# Patient Record
Sex: FEMALE | Race: WHITE
[De-identification: names, ages, dates, MRNs, and addresses within clinical notes are randomized per-mention and may not be internally consistent; named-entity substitution may affect disease eponyms.]

---

## 2019-06-10 ENCOUNTER — HOSPITAL ENCOUNTER (EMERGENCY)
Dept: HOSPITAL 91 - FTE | Age: 39
Discharge: HOME | End: 2019-06-10
Payer: SELF-PAY

## 2019-06-10 ENCOUNTER — HOSPITAL ENCOUNTER (EMERGENCY)
Dept: HOSPITAL 10 - FTE | Age: 39
Discharge: HOME | End: 2019-06-10
Payer: SELF-PAY

## 2019-06-10 VITALS
BODY MASS INDEX: 25.76 KG/M2 | BODY MASS INDEX: 25.76 KG/M2 | WEIGHT: 136.47 LBS | HEIGHT: 61 IN | HEIGHT: 61 IN | WEIGHT: 136.47 LBS

## 2019-06-10 VITALS — HEART RATE: 62 BPM | SYSTOLIC BLOOD PRESSURE: 110 MMHG | DIASTOLIC BLOOD PRESSURE: 67 MMHG

## 2019-06-10 VITALS — RESPIRATION RATE: 18 BRPM

## 2019-06-10 DIAGNOSIS — R10.2: ICD-10-CM
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
## 2019-06-10 DIAGNOSIS — T83.9XXA: Primary | ICD-10-CM

## 2019-06-10 LAB
ADD MAN DIFF?: NO
ADD UMIC: YES
ALANINE AMINOTRANSFERASE: 26 IU/L (ref 13–69)
ALBUMIN/GLOBULIN RATIO: 1.19
ALBUMIN: 4.3 G/DL (ref 3.3–4.9)
ALKALINE PHOSPHATASE: 92 IU/L (ref 42–121)
ANION GAP: 9 (ref 5–13)
ASPARTATE AMINO TRANSFERASE: 23 IU/L (ref 15–46)
BASOPHIL #: 0.1 10^3/UL (ref 0–0.1)
BASOPHILS %: 0.6 % (ref 0–2)
BILIRUBIN,DIRECT: 0 MG/DL (ref 0–0.2)
BILIRUBIN,TOTAL: 0.4 MG/DL (ref 0.2–1.3)
BLOOD UREA NITROGEN: 11 MG/DL (ref 7–20)
CALCIUM: 9.1 MG/DL (ref 8.4–10.2)
CARBON DIOXIDE: 26 MMOL/L (ref 21–31)
CHLORIDE: 105 MMOL/L (ref 97–110)
CREATININE: 0.57 MG/DL (ref 0.44–1)
EOSINOPHILS #: 0.2 10^3/UL (ref 0–0.5)
EOSINOPHILS %: 1.9 % (ref 0–7)
GLOBULIN: 3.6 G/DL (ref 1.3–3.2)
GLUCOSE: 108 MG/DL (ref 70–220)
HEMATOCRIT: 39.6 % (ref 37–47)
HEMOGLOBIN: 13.4 G/DL (ref 12–16)
IMMATURE GRANS #M: 0.04 10^3/UL (ref 0–0.03)
IMMATURE GRANS % (M): 0.4 % (ref 0–0.43)
LYMPHOCYTES #: 1.4 10^3/UL (ref 0.8–2.9)
LYMPHOCYTES %: 12.5 % (ref 15–51)
MEAN CORPUSCULAR HEMOGLOBIN: 30.3 PG (ref 29–33)
MEAN CORPUSCULAR HGB CONC: 33.8 G/DL (ref 32–37)
MEAN CORPUSCULAR VOLUME: 89.6 FL (ref 82–101)
MEAN PLATELET VOLUME: 9.2 FL (ref 7.4–10.4)
MONOCYTE #: 0.4 10^3/UL (ref 0.3–0.9)
MONOCYTES %: 3.3 % (ref 0–11)
NEUTROPHIL #: 8.9 10^3/UL (ref 1.6–7.5)
NEUTROPHILS %: 81.3 % (ref 39–77)
NUCLEATED RED BLOOD CELLS #: 0 10^3/UL (ref 0–0)
NUCLEATED RED BLOOD CELLS%: 0 /100WBC (ref 0–0)
PLATELET COUNT: 304 10^3/UL (ref 140–415)
POTASSIUM: 4 MMOL/L (ref 3.5–5.1)
RED BLOOD COUNT: 4.42 10^6/UL (ref 4.2–5.4)
RED CELL DISTRIBUTION WIDTH: 12.1 % (ref 11.5–14.5)
SODIUM: 140 MMOL/L (ref 135–144)
TOTAL PROTEIN: 7.9 G/DL (ref 6.1–8.1)
UR ASCORBIC ACID: NEGATIVE MG/DL
UR BACTERIA: (no result) /HPF
UR BILIRUBIN (DIP): NEGATIVE MG/DL
UR BLOOD (DIP): (no result) MG/DL
UR CLARITY: CLEAR
UR COLOR: (no result)
UR GLUCOSE (DIP): NEGATIVE MG/DL
UR KETONES (DIP): NEGATIVE MG/DL
UR LEUKOCYTE ESTERASE (DIP): NEGATIVE LEU/UL
UR NITRITE (DIP): NEGATIVE MG/DL
UR PH (DIP): 7 (ref 5–9)
UR RBC: 114 /HPF (ref 0–5)
UR SPECIFIC GRAVITY (DIP): 1.01 (ref 1–1.03)
UR SQUAMOUS EPITHELIAL CELL: (no result) /HPF
UR TOTAL PROTEIN (DIP): NEGATIVE MG/DL
UR UROBILINOGEN (DIP): NEGATIVE MG/DL
UR WBC: 3 /HPF (ref 0–5)
WHITE BLOOD COUNT: 11 10^3/UL (ref 4.8–10.8)

## 2019-06-10 PROCEDURE — 99285 EMERGENCY DEPT VISIT HI MDM: CPT

## 2019-06-10 PROCEDURE — 85025 COMPLETE CBC W/AUTO DIFF WBC: CPT

## 2019-06-10 PROCEDURE — 81025 URINE PREGNANCY TEST: CPT

## 2019-06-10 PROCEDURE — 76856 US EXAM PELVIC COMPLETE: CPT

## 2019-06-10 PROCEDURE — 80053 COMPREHEN METABOLIC PANEL: CPT

## 2019-06-10 PROCEDURE — 81001 URINALYSIS AUTO W/SCOPE: CPT

## 2019-06-10 PROCEDURE — 96372 THER/PROPH/DIAG INJ SC/IM: CPT

## 2019-06-10 RX ADMIN — KETOROLAC TROMETHAMINE 1 MG: 30 INJECTION, SOLUTION INTRAMUSCULAR at 15:06

## 2019-06-10 NOTE — ERD
ER Documentation


Chief Complaint


Chief Complaint





misplaced IUD, clinic unable to retrieve,  by pill last week





HPI


38-year-old female presenting the ED for IUD retrieval.  Patient was sent by her


OB because she passed out during the procedure when they tried to remove the 


IUD.  Patient is presenting to the ER alert oriented x4.  Patient states she has


pain in her pelvic region.  Patient denies any bleeding.  Patient states she had


a scheduled IUD removal and that the doctor told her that they were unsure if 


they were removed able to remove it or not.  Patient denies any past medical 


history, allergies to medications, and the patient is not currently taking any 


medications.  When the patient was asked where the clinic was in the name of the


doctor her response only was in [place].  This conversation was translated in 


demand translation application.  The patient has no idea the name of the doctor 


or the name the clinic and just states that it was in [place]





ROS


All systems reviewed and are negative except as per history of present illness.





Medications


Home Meds


Active Scripts


Ibuprofen* (Motrin*) 600 Mg Tab, 600 MG PO Q6, #30 TAB


   Prov:SYDNEY KING PA-C         6/10/19


Acetaminophen* (Tylenol*) 325 Mg Tablet, 2 TAB PO Q6 PRN for PAIN AND OR 


ELEVATED TEMP, #20 TAB


   Prov:SYDNEY KING PA-C         6/10/19


Cephalexin* (Keflex*) 500 Mg Capsule, 500 MG PO BID for 5 Days, CAP


   Prov:SYDNEY KING PA-C         6/10/19





Allergies


Allergies:  


Coded Allergies:  


     No Known Allergy (Unverified , 6/10/19)





PMhx/Soc


Medical and Surgical Hx:  pt denies Medical Hx, pt denies Surgical Hx


Hx Alcohol Use:  No


Hx Substance Use:  No


Hx Tobacco Use:  No


Smoking Status:  Never smoker





FmHx


Family History:  No diabetes, No coronary disease, No other





Physical Exam


Vitals





Vital Signs


  Date      Temp  Pulse  Resp  B/P (MAP)   Pulse Ox  O2          O2 Flow    FiO2


Time                                                 Delivery    Rate


   6/10/19  98.3     62            110/67        98  Room Air


     17:15                           (81)


   6/10/19  98.6     62    18      131/65        99


     12:58                           (87)





Physical Exam


Const:   No acute distress


Head:   Atraumatic 


Eyes:    Normal Conjunctiva


ENT:    Normal External Ears, Nose and Mouth.


Neck:               Full range of motion. No meningismus.


Resp:   Clear to auscultation bilaterally


Cardio:   Regular rate and rhythm, no murmurs


Abd:    Soft, non tender, non distended. Normal bowel sounds


Skin:   No petechiae or rashes


Back:   No midline or flank tenderness


Ext:    No cyanosis, or edema


Neur:   Awake and alert


Psych:    Normal Mood and Affect





Pelvic exam performed with REJI Granger


Pelvic exam was unremarkable there is no signs of a string to remove the IUD, 


the cervix is closed, the cervix has no ulcerations or bleeding coming from it


Result Diagram:  


6/10/19 1457                                                                    


           6/10/19 1457





Results 24 hrs





Laboratory Tests


Test
                             6/10/19
14:56   6/10/19
14:57    6/10/19
14:58


POC Beta HCG, Qualitative         NEGATIVE


White Blood Count                                 11.0 10^3/ul


Red Blood Count                                   4.42 10^6/ul


Hemoglobin                                           13.4 g/dl


Hematocrit                                              39.6 %


Mean Corpuscular Volume                                89.6 fl


Mean Corpuscular Hemoglobin                            30.3 pg


Mean Corpuscular                  
                 33.8 g/dl 
  



Hemoglobin
Concent


Red Cell Distribution Width                             12.1 %


Platelet Count                                     304 10^3/UL


Mean Platelet Volume                                    9.2 fl


Immature Granulocytes %                                0.400 %


Neutrophils %                                           81.3 %


Lymphocytes %                                           12.5 %


Monocytes %                                              3.3 %


Eosinophils %                                            1.9 %


Basophils %                                              0.6 %


Nucleated Red Blood Cells %                        0.0 /100WBC


Immature Granulocytes #                          0.040 10^3/ul


Neutrophils #                                      8.9 10^3/ul


Lymphocytes #                                      1.4 10^3/ul


Monocytes #                                        0.4 10^3/ul


Eosinophils #                                      0.2 10^3/ul


Basophils #                                        0.1 10^3/ul


Nucleated Red Blood Cells #                        0.0 10^3/ul


Sodium Level                                        140 mmol/L


Potassium Level                                     4.0 mmol/L


Chloride Level                                      105 mmol/L


Carbon Dioxide Level                                 26 mmol/L


Anion Gap                                                    9


Blood Urea Nitrogen                                   11 mg/dl


Creatinine                                          0.57 mg/dl


Est Glomerular Filtrat            
              > 60 mL/min 
   



Rate
mL/min


Glucose Level                                        108 mg/dl


Calcium Level                                        9.1 mg/dl


Total Bilirubin                                      0.4 mg/dl


Direct Bilirubin                                    0.00 mg/dl


Indirect Bilirubin                                   0.4 mg/dl


Aspartate Amino                   
                   23 IU/L 
  



Transf
(AST/SGOT)


Alanine                           
                   26 IU/L 
  



Aminotransferase
(ALT/SGPT)


Alkaline Phosphatase                                   92 IU/L


Total Protein                                         7.9 g/dl


Albumin                                               4.3 g/dl


Globulin                                             3.60 g/dl


Albumin/Globulin Ratio                                    1.19


Urine Color                                                      STRAW


Urine Clarity                                                    CLEAR


Urine pH                                                                    7.0


Urine Specific Gravity                                                    1.006


Urine Ketones                                                    NEGATIVE mg/dL


Urine Nitrite                                                    NEGATIVE mg/dL


Urine Bilirubin                                                  NEGATIVE mg/dL


Urine Urobilinogen                                               NEGATIVE mg/dL


Urine Leukocyte Esterase
         
              
               NEGATIVE
Angle/ul


Urine Microscopic RBC                                                  114 /HPF


Urine Microscopic WBC                                                    3 /HPF


Urine Squamous Epithelial
Cells   
              
               FEW /HPF 



Urine Bacteria                                                   FEW /HPF


Urine Hemoglobin                                                       3+ mg/dL


Urine Glucose                                                    NEGATIVE mg/dL


Urine Total Protein                                              NEGATIVE mg/dl





Current Medications


 Medications
   Dose
          Sig/Dolores
       Start Time
   Status  Last


 (Trade)       Ordered        Route
 PRN     Stop Time              Admin
Dose


                              Reason                                Admin


 Ketorolac
     30 mg          ONCE  STAT
    6/10/19       DC           6/10/19


Tromethamine
                 IM
            14:45
                       15:06



 (Toradol)                                   6/10/19 14:50








Procedures/MDM


ED course:


Ultrasound


Pelvic exam


Contacted OB [facility]


The patient was stable throughout the ED course.  The patient and/or family 


informed of laboratory and diagnostic imaging results throughout the ED course.











Diagnostic imaging:


Read by radiologist 


PROCEDURE:   US Pelvis. 


 


CLINICAL INDICATION:   pelvic pain , IUD placement


 


TECHNIQUE:   Multiple sonographic images of the pelvis were obtained utilizing 


transabdominal  technique.   The images were reviewed on a PACS workstation. 


 


COMPARISON:   None. 


 


FINDINGS:


 


The uterus is normal in size with a normal appearance of the myometrium.  The 


uterus measures 8.1 x 3.9 x 4.4 cm. 


The endometrial stripe is homogeneous in appearance and has the thickness of 7 


mm. There is an IUD within the endometrium in the fundus of the uterus.


 


The right ovary was not seen.


The left ovary measures 3.7 x 2.7 x 2.6 cm. There is a 1.5 cm simple cyst in the


left ovary. There is Doppler flow in the left ovary.


 


No free fluid is present within the pelvis.


 


RPTAT: AA


 


IMPRESSION:


 


IUD in appropriate position.


Small simple cyst in the left ovary.





Procedures:


Pelvic exam





Medications given in ER:


Toradol





Patient tolerated medication well with no adverse reactions.  Patient reported 


improvement in pain.





Medical decision makin-year-old female presenting with pelvic pain.  Patient states she was at her 


OB having an IUD removed when she passed out during the procedure.  Patient sta


chelo the doctor sent her to the hospital because they were unable to tell if the 


IUD was successfully removed.  Patient did pass out but she is presenting with 


vitals stable she is alert oriented x4 she did not hit her head.  She can recall


the events that happened and states that she just has pelvic pain and wants to 


know if the IUD is still in there.  The pelvic ultrasound indicated that the IUD


is in the appropriate position.  Patient's CBC indicated the patient has not 


anemic.  Patient has a slight elevation in white count.  Urine pregnancy 


negative the patient is hemodynamically stable.  The patient's abdominal exam 


was unremarkable.  During a pelvic exam that was done with chaperone ROSA Granger revealed that the IUD string was no longer attached and we were unable 


to retrieve the IUD.  I consulted the OB on-call Dr. Silverman, who advised me as 


long as the patient is stable and she is not pregnant she can be safely 


discharged and schedule a procedure to have this removed with her OB.  I 


provided the patient with resources for OB within the hospital to make an 


appointment with.  I explained to the patient the situation and she understands.


 Due to the procedure that was done at the other facility the elevated white 


count and the pelvic pain I am treat the patient out patiently with Keflex.  On 


reevaluation patient states improvement in pain just concerns that the IUD is 


still in her.  I advised the patient to avoid sexual intercourse until the 


issues resolved.  I cannot guarantee her that the IUD will stop her from 


becoming pregnant. acute coronary syndrome, AAA, mesenteric ischemia, lower lobe


pneumonia, DKA, bowel perforation, cholecystitis, choledocholithiasis, ascending


cholangitis, hepatic abscess, pancreatitis, PUD, gastritis, GERD, splenic 


rupture, diverticulitis, UTI, pyelonephritis, nephrolithiasis, appendicitis, 


constipation, pregnancy, ectopic pregnancy, PID, ovarian torsion or tubo-ovarian


abscess. Patient was advised if symptoms worsen or she experiences bleeding, 


fever chills, vaginal discharge, or unbearable pain to return the ER 


immediately.  Otherwise follow-up with your primary care provider or OB within 1


to 2 days regarding this issue the patient is in compliance with the treatment 


plan and had no further questions upon discharge.











Prescription for home:


Keflex


Acetaminophen


Motrin





Discharge:


At this time, patient is stable for discharge and outpatient management.  I have


instructed the patient to follow-up with his\her primary care physician in 1 to 


2 days.  I have discussed with the patient the possibility of needing to see a 


specialist for further work-up and imaging studies if symptoms persist.  I have 


instructed the patient to promptly return to the ER for any new or worsening 


symptoms including increased pain, fever, nausea, vomiting, weakness or LOC.  


The patient and\or family expressed understanding of and agreement with this 


plan.  All questions were answered.  Home care instructions were provided.








Disclaimer:


Inadvertent spelling and grammatical errors are likely due to EHR\dictation 


software use and do not reflect on the overall quality of patient care.  Also, 


please note that the electronic time recorded on the note does not necessarily 


reflect the actual time of the patient encounter.





Departure


Diagnosis:  


   Primary Impression:  


   IUD complication


   Device complication type:  unspecified  Encounter type:  initial encounter  


   Qualified Codes:  T83.9XXA - Unspecified complication of genitourinary 


   prosthetic device, implant and graft, initial encounter


   Additional Impression:  


   Pelvic pain


Condition:  Stable


Referrals:  


FELICIANO PEREZ MD,YUAN ZAFAR MD





Additional Instructions:  


Contact your OB to have IUD removed.  I have provided you with resources if you 


do not have an OB.  If you experience fever chills pain bleeding discomfort 


signs of infection return immediately to the ER











SYDNEY KING PA-C               Juan David 10, 2019 17:00